# Patient Record
Sex: MALE | Race: WHITE | NOT HISPANIC OR LATINO | Employment: FULL TIME | URBAN - METROPOLITAN AREA
[De-identification: names, ages, dates, MRNs, and addresses within clinical notes are randomized per-mention and may not be internally consistent; named-entity substitution may affect disease eponyms.]

---

## 2017-08-07 ENCOUNTER — PATIENT OUTREACH (OUTPATIENT)
Dept: ADMINISTRATIVE | Facility: HOSPITAL | Age: 37
End: 2017-08-07

## 2020-02-04 ENCOUNTER — OFFICE VISIT (OUTPATIENT)
Dept: INTERNAL MEDICINE | Facility: CLINIC | Age: 40
End: 2020-02-04
Payer: COMMERCIAL

## 2020-02-04 VITALS
TEMPERATURE: 99 F | SYSTOLIC BLOOD PRESSURE: 130 MMHG | WEIGHT: 242.94 LBS | BODY MASS INDEX: 34.78 KG/M2 | DIASTOLIC BLOOD PRESSURE: 100 MMHG | HEIGHT: 70 IN | HEART RATE: 85 BPM

## 2020-02-04 DIAGNOSIS — R07.89 ATYPICAL CHEST PAIN: ICD-10-CM

## 2020-02-04 DIAGNOSIS — E66.09 CLASS 1 OBESITY DUE TO EXCESS CALORIES WITH SERIOUS COMORBIDITY AND BODY MASS INDEX (BMI) OF 34.0 TO 34.9 IN ADULT: Chronic | ICD-10-CM

## 2020-02-04 DIAGNOSIS — Z13.1 SCREENING FOR DIABETES MELLITUS: ICD-10-CM

## 2020-02-04 DIAGNOSIS — G47.33 OBSTRUCTIVE SLEEP APNEA SYNDROME: ICD-10-CM

## 2020-02-04 DIAGNOSIS — I10 BENIGN ESSENTIAL HYPERTENSION: Primary | ICD-10-CM

## 2020-02-04 DIAGNOSIS — Z13.220 SCREENING FOR LIPID DISORDERS: ICD-10-CM

## 2020-02-04 DIAGNOSIS — R53.83 OTHER FATIGUE: ICD-10-CM

## 2020-02-04 DIAGNOSIS — Z11.4 SCREENING FOR HIV WITHOUT PRESENCE OF RISK FACTORS: ICD-10-CM

## 2020-02-04 LAB — GLUCOSE SERPL-MCNC: 73 MG/DL (ref 70–110)

## 2020-02-04 PROCEDURE — 3008F BODY MASS INDEX DOCD: CPT | Mod: CPTII,S$GLB,, | Performed by: FAMILY MEDICINE

## 2020-02-04 PROCEDURE — 99204 OFFICE O/P NEW MOD 45 MIN: CPT | Mod: S$GLB,,, | Performed by: FAMILY MEDICINE

## 2020-02-04 PROCEDURE — 82962 GLUCOSE BLOOD TEST: CPT | Mod: S$GLB,,, | Performed by: FAMILY MEDICINE

## 2020-02-04 PROCEDURE — 99999 PR PBB SHADOW E&M-EST. PATIENT-LVL IV: ICD-10-PCS | Mod: PBBFAC,,, | Performed by: FAMILY MEDICINE

## 2020-02-04 PROCEDURE — 99999 PR PBB SHADOW E&M-EST. PATIENT-LVL IV: CPT | Mod: PBBFAC,,, | Performed by: FAMILY MEDICINE

## 2020-02-04 PROCEDURE — 3080F PR MOST RECENT DIASTOLIC BLOOD PRESSURE >= 90 MM HG: ICD-10-PCS | Mod: CPTII,S$GLB,, | Performed by: FAMILY MEDICINE

## 2020-02-04 PROCEDURE — 3075F PR MOST RECENT SYSTOLIC BLOOD PRESS GE 130-139MM HG: ICD-10-PCS | Mod: CPTII,S$GLB,, | Performed by: FAMILY MEDICINE

## 2020-02-04 PROCEDURE — 3080F DIAST BP >= 90 MM HG: CPT | Mod: CPTII,S$GLB,, | Performed by: FAMILY MEDICINE

## 2020-02-04 PROCEDURE — 3008F PR BODY MASS INDEX (BMI) DOCUMENTED: ICD-10-PCS | Mod: CPTII,S$GLB,, | Performed by: FAMILY MEDICINE

## 2020-02-04 PROCEDURE — 82962 POCT GLUCOSE, HAND-HELD DEVICE: ICD-10-PCS | Mod: S$GLB,,, | Performed by: FAMILY MEDICINE

## 2020-02-04 PROCEDURE — 3075F SYST BP GE 130 - 139MM HG: CPT | Mod: CPTII,S$GLB,, | Performed by: FAMILY MEDICINE

## 2020-02-04 PROCEDURE — 99204 PR OFFICE/OUTPT VISIT, NEW, LEVL IV, 45-59 MIN: ICD-10-PCS | Mod: S$GLB,,, | Performed by: FAMILY MEDICINE

## 2020-02-04 RX ORDER — METOPROLOL SUCCINATE 50 MG/1
50 TABLET, EXTENDED RELEASE ORAL NIGHTLY
Qty: 30 TABLET | Refills: 1 | Status: SHIPPED | OUTPATIENT
Start: 2020-02-04 | End: 2020-03-11 | Stop reason: ALTCHOICE

## 2020-02-04 NOTE — PROGRESS NOTES
CHIEF COMPLAINT  Establish Care    This is my first time treating him here. All problems addressed today are NEW TO ME.  He is requesting that I take over as his primary care provider.     SUMMARY OF DIAGNOSES AND ASSOCIATED ORDERS    1. Benign essential hypertension       ASSESSMENT: Asymptomatic, uncontrolled, untreated.  - SCHEDULED EKG 12-LEAD (to Muse); Future  - X-Ray Chest PA And Lateral; Future  - POCT Glucose, Hand-Held Device  - Lipid panel; Future  - HIV 1/2 Ag/Ab (4th Gen); Future  - Comprehensive metabolic panel; Future  - CBC auto differential; Future  - TSH; Future  - metoprolol succinate (TOPROL-XL) 50 MG 24 hr tablet; Take 1 tablet (50 mg total) by mouth every evening.  Dispense: 30 tablet; Refill: 1  - Hypertension Digital Medicine ([facility]) Enrollment Order  - Hypertension Digital Medicine ([facility]): Assign Onboarding Questionnaires    2. Atypical chest pain       ASSESSMENT: He reports few month history of intermittent non-exertional brief sharp mid-chest pain. No hemoptysis or trouble breathing reported.   - SCHEDULED EKG 12-LEAD (to Muse); Future  - X-Ray Chest PA And Lateral; Future  - Comprehensive metabolic panel; Future  - Cardiac treadmill stress test; Future  - metoprolol succinate (TOPROL-XL) 50 MG 24 hr tablet; Take 1 tablet (50 mg total) by mouth every evening.  Dispense: 30 tablet; Refill: 1    3. Class 1 obesity due to excess calories with serious comorbidity and body mass index (BMI) of 34.0 to 34.9 in adult       ASSESSMENT: Chronic. Appears exogenous.  - POCT Glucose, Hand-Held Device  - Comprehensive metabolic panel; Future  - TSH; Future    4. Obstructive sleep apnea syndrome       ASSESSMENT: He has multiple anatomic and historical risk factors for obstructive sleep apnea, as noted below.   - Home Sleep Studies; Future    5. Fatigue       ASSESSMENT: Likely multifactorial, in part due to deconditioning.  - Comprehensive metabolic panel; Future  - CBC auto differential; Future  - TSH;  Future    6. Screening for HIV without presence of risk factors       ASSESSMENT: He reports no specific exposure or risk factor or specific relevant symptoms, but he accepted offer for screening.   - HIV 1/2 Ag/Ab (4th Gen); Future    7. Screening for diabetes mellitus       ASSESSMENT: Ordered.  - Comprehensive metabolic panel; Future    8. Screening for lipid disorders       ASSESSMENT: Ordered.  - Lipid panel; Future    ADDITIONAL HISTORY OF PRESENT ILLNESS  Problem List Items Addressed This Visit        Cardiac/Vascular    Benign essential hypertension - Primary (Chronic)    Relevant Medications    metoprolol succinate (TOPROL-XL) 50 MG 24 hr tablet    Other Relevant Orders    SCHEDULED EKG 12-LEAD (to Muse) (Completed)    X-Ray Chest PA And Lateral (Completed)    POCT Glucose, Hand-Held Device (Completed)    Lipid panel (Completed)    HIV 1/2 Ag/Ab (4th Gen) (Completed)    Comprehensive metabolic panel (Completed)    CBC auto differential (Completed)    TSH (Completed)    Hypertension Digital Medicine (HDM) Enrollment Order (Completed)    Hypertension Digital Medicine ([facility]): Assign Onboarding Questionnaires (Completed)       Endocrine    Class 1 obesity due to excess calories with serious comorbidity and body mass index (BMI) of 34.0 to 34.9 in adult (Chronic)    Relevant Orders    POCT Glucose, Hand-Held Device (Completed)    Comprehensive metabolic panel (Completed)    TSH (Completed)       Other    Atypical chest pain    Current Assessment & Plan     3 months, evening, non-exer, mid-chest, pressure, last episode 2 weeks ago lasted 2 hours         Relevant Medications    metoprolol succinate (TOPROL-XL) 50 MG 24 hr tablet    Other Relevant Orders    SCHEDULED EKG 12-LEAD (to Muse) (Completed)    X-Ray Chest PA And Lateral (Completed)    Comprehensive metabolic panel (Completed)    Cardiac treadmill stress test (Completed)    Fatigue    Relevant Orders    Comprehensive metabolic panel (Completed)    CBC  auto differential (Completed)    TSH (Completed)    Obstructive sleep apnea syndrome    Current Assessment & Plan     STOP-BANG questionnaire to assess risk for obstructive sleep apnea (NEWTON)  · Snoring: Do you snore loudly? ANSWER: YES  · Tired: Do you often feel tired, fatigued, or sleepy during daytime? ANSWER: YES Wittman Sleepiness Scale Score = 9 (HIGHER NORMAL Daytime Sleepiness)  · Observed: Has anyone observed you stop breathing during your sleep? ANSWER: YES  · Pressure: Do you have or are you being treated for high blood pressure? ANSWER: YES  · BMI: BMI more than 35 kg/m2? ANSWER: NO (BMI = Body mass index is 34.86 kg/m².)   · Age: Age over 50 yr old? ANSWER: NO (Age = 39 y.o.)  · Neck circumference: Neck circumference greater than 40 cm? ANSWER: YES (Neck circumference = 47 cm, Mallampati class 3 oropharynx.  · Gender: Gender male? ANSWER: YES (Gender = male)    STOP-BANG Score = 6 (HIGH risk of NEWTON)    ADDITIONAL RELEVANT HISTORY: He has noticed or has been told that he wakes up gasping or choking.  He has been told he stops breathing when he sleeps.  He reports non-restorative sleep.  He reports frequent tension type headaches.  He reports involuntary weight gain.     INSTRUCTIONS PROVIDED: Do not drive or perform hazardous activities while drowsy.          Relevant Orders    Home Sleep Studies      Other Visit Diagnoses     Screening for HIV without presence of risk factors        Relevant Orders    HIV 1/2 Ag/Ab (4th Gen) (Completed)    Screening for diabetes mellitus        Relevant Orders    Comprehensive metabolic panel (Completed)    Screening for lipid disorders        Relevant Orders    Lipid panel (Completed)          MEDICATION MANAGMENT    MEDICATIONS SUMMARY: I am having Kenny Savannah Treviño start on metoprolol succinate.    No outpatient medications prior to visit.     No facility-administered medications prior to visit.         There are no discontinued medications.     Medications Ordered This  "Encounter   Medications    metoprolol succinate (TOPROL-XL) 50 MG 24 hr tablet     Sig: Take 1 tablet (50 mg total) by mouth every evening.     Dispense:  30 tablet     Refill:  1        FOLLOW-UP  Follow up in about 3 weeks (around 2/25/2020) for review test results, discuss treatment plan, re-evaluate problem(s) discussed today.     REVIEW OF SYSTEMS  CONSTITUTIONAL: No fever reported.  CARDIOVASCULAR: No typical angina reported. No syncope or near-syncope reported.  PULMONARY: No hemoptysis reported.  PSYCHIATRIC: No mood instability reported.  NEUROLOGIC: No seizures reported.  ENDOCRINE: No polydipsia reported.  GASTROINTESTINAL: No blood in stool reported.  GENITOURINARY: No hematuria reported.  ENT: No acute hearing changes reported.  OPHTHALMOLOGIC: No acute vision changes reported.  HEMATOLOGIC: No bleeding problems reported.  MUSCULOSKELETAL: No recent injuries reported.  DERMATOLOGIC: No skin infection reported.  REMAINDER OF COMPLETE REVIEW OF SYSTEMS is negative or noncontributory to present illness except as noted herein.     PHYSICAL EXAM  Vitals:    02/04/20 1712   BP: (!) 130/100   BP Location: Left arm   Patient Position: Sitting   BP Method: Medium (Manual)   Pulse: 85   Temp: 98.7 °F (37.1 °C)   TempSrc: Tympanic   Weight: 110.2 kg (242 lb 15.2 oz)   Height: 5' 10" (1.778 m)     CONSTITUTIONAL: Vital signs noted. No apparent distress. Does not appear acutely ill or septic. Appears adequately hydrated.  EYE: Sclerae anicteric. Lids and conjunctiva unremarkable.  ENT: External ENT unremarkable. Oropharynx moist. Hearing grossly intact.   NECK: Trachea midline. Thyroid nontender.  PULM: Lungs clear. Breathing unlabored.  CV: Auscultation reveals regular rate and rhythm without murmur, gallop or rub. No carotid bruit.  GI: Soft and nontender. Bowel sounds normal.  DERM: Skin warm and moist with normal turgor.  NEURO: There are no gross focal motor deficits or gross deficits of cranial nerves " "III-XII.  PSYCH: Alert and oriented x 3. Mood is grossly neutral. Affect appropriate. Judgment and insight not grossly compromised.  MSK:  Stance and gait are grossly normal.     PAST MEDICAL HISTORY  He has a past medical history of Benign essential hypertension and Testicular cancer.    SURGICAL HISTORY  He has a past surgical history that includes Hernia repair; testicle removal  (Right); Adenoidectomy; Tonsillectomy; and Vasectomy.    FAMILY HISTORY  His family history includes Diabetes in his father; Heart disease in his paternal grandfather.     ALLERGIES  Review of patient's allergies indicates:   Allergen Reactions    Pcn [penicillins]        SOCIAL HISTORY   TOBACCO USE HISTORY: He reports that he has never smoked. He has never used smokeless tobacco.   ALCOHOL USE HISTORY:  He reports that he drinks alcohol.   RECREATIONAL DRUG USE HISTORY:  He reports that he does not use drugs.     Documentation entered by me for this encounter may have been done in part using speech-recognition technology. Although I have made an effort to ensure accuracy, "sound like" errors may exist and should be interpreted in context. -CYN Rodriguez MD.    "

## 2020-02-05 ENCOUNTER — LAB VISIT (OUTPATIENT)
Dept: LAB | Facility: HOSPITAL | Age: 40
End: 2020-02-05
Attending: FAMILY MEDICINE
Payer: COMMERCIAL

## 2020-02-05 ENCOUNTER — CLINICAL SUPPORT (OUTPATIENT)
Dept: CARDIOLOGY | Facility: CLINIC | Age: 40
End: 2020-02-05
Payer: COMMERCIAL

## 2020-02-05 ENCOUNTER — PATIENT MESSAGE (OUTPATIENT)
Dept: ADMINISTRATIVE | Facility: OTHER | Age: 40
End: 2020-02-05

## 2020-02-05 ENCOUNTER — TELEPHONE (OUTPATIENT)
Dept: PULMONOLOGY | Facility: CLINIC | Age: 40
End: 2020-02-05

## 2020-02-05 ENCOUNTER — HOSPITAL ENCOUNTER (OUTPATIENT)
Dept: RADIOLOGY | Facility: HOSPITAL | Age: 40
Discharge: HOME | End: 2020-02-05
Attending: FAMILY MEDICINE
Payer: COMMERCIAL

## 2020-02-05 DIAGNOSIS — R53.83 OTHER FATIGUE: ICD-10-CM

## 2020-02-05 DIAGNOSIS — R07.89 ATYPICAL CHEST PAIN: ICD-10-CM

## 2020-02-05 DIAGNOSIS — I10 BENIGN ESSENTIAL HYPERTENSION: ICD-10-CM

## 2020-02-05 DIAGNOSIS — Z11.4 SCREENING FOR HIV WITHOUT PRESENCE OF RISK FACTORS: ICD-10-CM

## 2020-02-05 DIAGNOSIS — Z13.1 SCREENING FOR DIABETES MELLITUS: ICD-10-CM

## 2020-02-05 DIAGNOSIS — E66.09 CLASS 1 OBESITY DUE TO EXCESS CALORIES WITH SERIOUS COMORBIDITY AND BODY MASS INDEX (BMI) OF 34.0 TO 34.9 IN ADULT: Chronic | ICD-10-CM

## 2020-02-05 DIAGNOSIS — Z13.220 SCREENING FOR LIPID DISORDERS: ICD-10-CM

## 2020-02-05 LAB
ALBUMIN SERPL BCP-MCNC: 4.1 G/DL (ref 3.5–5.2)
ALP SERPL-CCNC: 66 U/L (ref 55–135)
ALT SERPL W/O P-5'-P-CCNC: 36 U/L (ref 10–44)
ANION GAP SERPL CALC-SCNC: 11 MMOL/L (ref 8–16)
AST SERPL-CCNC: 22 U/L (ref 10–40)
BASOPHILS # BLD AUTO: 0.06 K/UL (ref 0–0.2)
BASOPHILS NFR BLD: 0.7 % (ref 0–1.9)
BILIRUB SERPL-MCNC: 0.9 MG/DL (ref 0.1–1)
BUN SERPL-MCNC: 13 MG/DL (ref 6–20)
CALCIUM SERPL-MCNC: 9.5 MG/DL (ref 8.7–10.5)
CHLORIDE SERPL-SCNC: 104 MMOL/L (ref 95–110)
CHOLEST SERPL-MCNC: 187 MG/DL (ref 120–199)
CHOLEST/HDLC SERPL: 6.2 {RATIO} (ref 2–5)
CO2 SERPL-SCNC: 27 MMOL/L (ref 23–29)
CREAT SERPL-MCNC: 1.2 MG/DL (ref 0.5–1.4)
DIFFERENTIAL METHOD: ABNORMAL
EOSINOPHIL # BLD AUTO: 0.2 K/UL (ref 0–0.5)
EOSINOPHIL NFR BLD: 2.5 % (ref 0–8)
ERYTHROCYTE [DISTWIDTH] IN BLOOD BY AUTOMATED COUNT: 12.4 % (ref 11.5–14.5)
EST. GFR  (AFRICAN AMERICAN): >60 ML/MIN/1.73 M^2
EST. GFR  (NON AFRICAN AMERICAN): >60 ML/MIN/1.73 M^2
GLUCOSE SERPL-MCNC: 101 MG/DL (ref 70–110)
HCT VFR BLD AUTO: 56.3 % (ref 40–54)
HDLC SERPL-MCNC: 30 MG/DL (ref 40–75)
HDLC SERPL: 16 % (ref 20–50)
HGB BLD-MCNC: 17.8 G/DL (ref 14–18)
IMM GRANULOCYTES # BLD AUTO: 0.02 K/UL (ref 0–0.04)
IMM GRANULOCYTES NFR BLD AUTO: 0.2 % (ref 0–0.5)
LDLC SERPL CALC-MCNC: 111.4 MG/DL (ref 63–159)
LYMPHOCYTES # BLD AUTO: 2.6 K/UL (ref 1–4.8)
LYMPHOCYTES NFR BLD: 31.2 % (ref 18–48)
MCH RBC QN AUTO: 30 PG (ref 27–31)
MCHC RBC AUTO-ENTMCNC: 31.6 G/DL (ref 32–36)
MCV RBC AUTO: 95 FL (ref 82–98)
MONOCYTES # BLD AUTO: 0.8 K/UL (ref 0.3–1)
MONOCYTES NFR BLD: 8.9 % (ref 4–15)
NEUTROPHILS # BLD AUTO: 4.8 K/UL (ref 1.8–7.7)
NEUTROPHILS NFR BLD: 56.5 % (ref 38–73)
NONHDLC SERPL-MCNC: 157 MG/DL
NRBC BLD-RTO: 0 /100 WBC
PLATELET # BLD AUTO: 210 K/UL (ref 150–350)
PMV BLD AUTO: 11.4 FL (ref 9.2–12.9)
POTASSIUM SERPL-SCNC: 4 MMOL/L (ref 3.5–5.1)
PROT SERPL-MCNC: 7.3 G/DL (ref 6–8.4)
RBC # BLD AUTO: 5.94 M/UL (ref 4.6–6.2)
SODIUM SERPL-SCNC: 142 MMOL/L (ref 136–145)
TRIGL SERPL-MCNC: 228 MG/DL (ref 30–150)
TSH SERPL DL<=0.005 MIU/L-ACNC: 1.52 UIU/ML (ref 0.4–4)
WBC # BLD AUTO: 8.46 K/UL (ref 3.9–12.7)

## 2020-02-05 PROCEDURE — 80053 COMPREHEN METABOLIC PANEL: CPT

## 2020-02-05 PROCEDURE — 85025 COMPLETE CBC W/AUTO DIFF WBC: CPT

## 2020-02-05 PROCEDURE — 80061 LIPID PANEL: CPT

## 2020-02-05 PROCEDURE — 93010 EKG 12-LEAD: ICD-10-PCS | Mod: S$GLB,,, | Performed by: INTERNAL MEDICINE

## 2020-02-05 PROCEDURE — 71046 X-RAY EXAM CHEST 2 VIEWS: CPT | Mod: TC

## 2020-02-05 PROCEDURE — 93005 EKG 12-LEAD: ICD-10-PCS | Mod: S$GLB,,, | Performed by: FAMILY MEDICINE

## 2020-02-05 PROCEDURE — 93005 ELECTROCARDIOGRAM TRACING: CPT | Mod: S$GLB,,, | Performed by: FAMILY MEDICINE

## 2020-02-05 PROCEDURE — 93010 ELECTROCARDIOGRAM REPORT: CPT | Mod: S$GLB,,, | Performed by: INTERNAL MEDICINE

## 2020-02-05 PROCEDURE — 71046 X-RAY EXAM CHEST 2 VIEWS: CPT | Mod: 26,,, | Performed by: RADIOLOGY

## 2020-02-05 PROCEDURE — 71046 XR CHEST PA AND LATERAL: ICD-10-PCS | Mod: 26,,, | Performed by: RADIOLOGY

## 2020-02-05 PROCEDURE — 36415 COLL VENOUS BLD VENIPUNCTURE: CPT

## 2020-02-05 PROCEDURE — 86703 HIV-1/HIV-2 1 RESULT ANTBDY: CPT

## 2020-02-05 PROCEDURE — 84443 ASSAY THYROID STIM HORMONE: CPT

## 2020-02-05 NOTE — PATIENT INSTRUCTIONS
Someone from [facility] will be contacting you soon to help you get the home sleep test done. If you haven't heard from them within 2 weeks, please call the [facility] at [phone] and let them know that you were ordered to have a home sleep test done and that you haven't yet been contacted.       Enroll in [facility]'s HYPERTENSION Digital Medicine program and you can use an electronic blood pressure monitor to measure your blood pressure at home. Those numbers are automatically sent by your smartphone to your hypertension care team for review. You will also receive in-depth education on high blood pressure and the lifestyle changes you can make to improve your blood pressure.    Enrolling is easy!  Stop by the O-Bar in the 1st floor lobby of [facility].  You can also call the [facility] Help Desk at [phone] to get started.

## 2020-02-05 NOTE — ASSESSMENT & PLAN NOTE
STOP-BANG questionnaire to assess risk for obstructive sleep apnea (NEWTON)  · Snoring: Do you snore loudly? ANSWER: YES  · Tired: Do you often feel tired, fatigued, or sleepy during daytime? ANSWER: YES Duncan Sleepiness Scale Score = 9 (HIGHER NORMAL Daytime Sleepiness)  · Observed: Has anyone observed you stop breathing during your sleep? ANSWER: YES  · Pressure: Do you have or are you being treated for high blood pressure? ANSWER: YES  · BMI: BMI more than 35 kg/m2? ANSWER: NO (BMI = Body mass index is 34.86 kg/m².)   · Age: Age over 50 yr old? ANSWER: NO (Age = 39 y.o.)  · Neck circumference: Neck circumference greater than 40 cm? ANSWER: YES (Neck circumference = 47 cm, Mallampati class 3 oropharynx.  · Gender: Gender male? ANSWER: YES (Gender = male)    STOP-BANG Score = 6 (HIGH risk of NEWTON)    ADDITIONAL RELEVANT HISTORY: He has noticed or has been told that he wakes up gasping or choking.  He has been told he stops breathing when he sleeps.  He reports non-restorative sleep.  He reports frequent tension type headaches.  He reports involuntary weight gain.     INSTRUCTIONS PROVIDED: Do not drive or perform hazardous activities while drowsy.

## 2020-02-05 NOTE — TELEPHONE ENCOUNTER
----- Message from Kristel Bedoya sent at 2/5/2020  9:32 AM CST -----  [facility]

## 2020-02-06 LAB — HIV 1+2 AB+HIV1 P24 AG SERPL QL IA: NEGATIVE

## 2020-02-07 ENCOUNTER — CLINICAL SUPPORT (OUTPATIENT)
Dept: CARDIOLOGY | Facility: CLINIC | Age: 40
End: 2020-02-07
Attending: FAMILY MEDICINE
Payer: COMMERCIAL

## 2020-02-07 ENCOUNTER — PATIENT OUTREACH (OUTPATIENT)
Dept: OTHER | Facility: OTHER | Age: 40
End: 2020-02-07

## 2020-02-07 DIAGNOSIS — R07.89 ATYPICAL CHEST PAIN: ICD-10-CM

## 2020-02-07 LAB — DIASTOLIC DYSFUNCTION: NO

## 2020-02-07 PROCEDURE — 93015 CARDIAC TREADMILL STRESS TEST: ICD-10-PCS | Mod: S$GLB,,, | Performed by: INTERNAL MEDICINE

## 2020-02-07 PROCEDURE — 93015 CV STRESS TEST SUPVJ I&R: CPT | Mod: S$GLB,,, | Performed by: INTERNAL MEDICINE

## 2020-02-07 NOTE — LETTER
February 13, 2020     Kenny Stern  [address] [address]       Dear Kenny Russell,    Welcome to [facility]! Our goal is to make care effective, proactive and convenient by using data you send us from home to better treat your chronic conditions.              My name is Gladis Stiles, and I am your dedicated Digital Medicine clinician. As an expert in medication management, I will help ensure that the medications you are taking continue to provide the intended benefits and help you reach your goals. You can reach me directly at [phone] or by sending me a message directly through your [facility] account.      I am Adolfo Walker and I will be your health . My job is to help you identify lifestyle changes to improve your disease control. We will talk about nutrition, exercise, and other ways you may be able to adjust your current habits to better your health. Additionally, we will help ensure you are completing the tests and screenings that are necessary to help manage your conditions. You can reach me directly at [phone] or by sending me a message directly through your [facility] account.    Most importantly, YOU are at the center of this team. Together, we will work to improve your overall health and encourage you to meet your goals for a healthier lifestyle.     What we expect from YOU:  · Please take frequent home blood pressure measurements. We ask that you take at least 1 blood pressure reading per week, but more information will better help us get you know you. Be sure you rest for a few minutes before taking the reading in a quiet, comfortable place.     Be available to receive phone calls or ePrept messages, when appropriate, from your care team. Please let us know if there are any specific days or times that work best for us to reach you via phone.     Complete routine tests and screenings. Dont worry, we will help keep you on  track!           What you should expect from your Digital Medicine Care Team:   We will work with you to create a personalized plan of care and provide you with encouragement and education, including regarding lifestyle changes, that could help you manage your disease states.     We will adjust your current medications, if needed, and continue to monitor your long-term progress.     We will provide you and your physician with monthly progress reports after you have been in the program for more than 30 days.     We will send you reminders through [facility] and text messages to help ensure you do not miss any testing deadlines to help manage your disease states.    You will be able to reach us by phone or through your [facility] account by clicking our names under Care Team on the right side of the home screen.    I look forward to working with you to achieve your blood pressure goals!    We look forward to working with you to help manage your health,    Sincerely,    Your Digital Medicine Team    Please visit our websites to learn more:   · Hypertension: [email or web address]      Remember, we are not available for emergencies. If you have an emergency, please contact your doctors office directly or call [facility] on-call ([phone] or [phone]) or [phone].

## 2020-02-07 NOTE — LETTER
February 13, 2020     Kenny Stern  [address] [address]       Dear Kenny Russell,    Welcome to [facility]! Our goal is to make care effective, proactive and convenient by using data you send us from home to better treat your chronic conditions.              My name is Gladis Stiles, and I am your dedicated Digital Medicine clinician. As an expert in medication management, I will help ensure that the medications you are taking continue to provide the intended benefits and help you reach your goals. You can reach me directly at [phone] or by sending me a message directly through your [facility] account.      I am Adolfo Walker and I will be your health . My job is to help you identify lifestyle changes to improve your disease control. We will talk about nutrition, exercise, and other ways you may be able to adjust your current habits to better your health. Additionally, we will help ensure you are completing the tests and screenings that are necessary to help manage your conditions. You can reach me directly at [phone] or by sending me a message directly through your [facility] account.    Most importantly, YOU are at the center of this team. Together, we will work to improve your overall health and encourage you to meet your goals for a healthier lifestyle.     What we expect from YOU:  · Please take frequent home blood pressure measurements. We ask that you take at least 1 blood pressure reading per week, but more information will better help us get you know you. Be sure you rest for a few minutes before taking the reading in a quiet, comfortable place.     Be available to receive phone calls or [employer] messages, when appropriate, from your care team. Please let us know if there are any specific days or times that work best for us to reach you via phone.     Complete routine tests and screenings. Dont worry, we will help keep you on  track!           What you should expect from your Digital Medicine Care Team:   We will work with you to create a personalized plan of care and provide you with encouragement and education, including regarding lifestyle changes, that could help you manage your disease states.     We will adjust your current medications, if needed, and continue to monitor your long-term progress.     We will provide you and your physician with monthly progress reports after you have been in the program for more than 30 days.     We will send you reminders through [facility] and text messages to help ensure you do not miss any testing deadlines to help manage your disease states.    You will be able to reach us by phone or through your [facility] account by clicking our names under Care Team on the right side of the home screen.    I look forward to working with you to achieve your blood pressure goals!    We look forward to working with you to help manage your health,    Sincerely,    Your Digital Medicine Team    Please visit our websites to learn more:   · Hypertension: [email or web address]      Remember, we are not available for emergencies. If you have an emergency, please contact your doctors office directly or call [facility] on-call ([phone] or [phone]) or [phone].

## 2020-02-13 NOTE — PROGRESS NOTES
Digital Medicine: Health  Introduction    Introduced Kenny Drew Stern to Digital Medicine. Discussed health  role and recommended lifestyle modifications.    Mr. Calero will be on vacation from next week until the week of March 3rd. He asked that we please do follow up calls after his return. He did not specify if he will continue submitting readings during this time.     The history is provided by the patient. No  was used.     HYPERTENSION  Our goal is to get BP to consistently below 130/80mmHg and make the process convenient so patient can avoid extra trips to the office. Getting your blood pressure below 130/80mmHg (definition of control) will reduce your risk for heart attack, kidney failure, stroke and death (as well as kidney failure, eye disease, & dementia)      Reviewed that the Digital Medicine care team - consisting of a clinician and a health  - will follow the most current evidence-based national guidelines for treating your condition.  The health  will focus on lifestyle modifications and motivation while the clinician will focus on medication therapy.  The care team will review all data on a regular basis and reach out as needed.      Explained that one of the key parts of the program is communication with the care team.  Asked patient to respond to outreach attempts and complete questionnaires.  Stressed importance of medication adherence.    Explained that we expect patient to obtain several blood pressures per week at random times of day.  Instructed patient not to allow anyone else to use phone and monitoring device.  Confirmed appropriate BP monitoring technique.      Explained to patient that the digital medicine team is not available for emergencies.  Patient will call [facility] on-call ([phone] or [phone]) or [phone] if needed.      Patient's BP goal is 130/80.Patient's BP average is 141/100 mmHg, which is above goal, per 2017 ACC/AHA  Hypertension Guidelines.        Last 5 Patient Entered Readings                                      Current 30 Day Average: 141/100     Recent Readings 2/7/2020 2/6/2020 2/5/2020 2/5/2020    SBP (mmHg) 139 140 145 150    DBP (mmHg) 99 94 96 109    Pulse 66 77 80 72        There are no preventive care reminders to display for this patient.    Reviewed the importance of self-monitoring, medication adherence, and that the health  can be used as a resource for lifestyle modifications to help reduce or maintain a healthy lifestyle.    Sent link to [facility] webpages and my contact information via [employer] for future questions. Follow up scheduled.

## 2020-02-16 NOTE — PROGRESS NOTES
"Hi, Kenny.    I'm happy to report that these test results are fine and do not require a change in treatment.    Thanks for letting me care for you, thanks for trusting us with your healthcare needs, and thanks for using [facility].    Sincerely,    CYN Rodriguez MD    P.S. - Please tell me how I'm doing and review me at [email or web address].  --------------------------------------------------------------------------------   Want to learn more about your test results and what they mean? (1) Log in to your [facility] account at [email or web address]. (2) From the "View test results" tab, click on the test you want to know more about. (3) Click on the "About This Test" link."

## 2020-02-16 NOTE — PROGRESS NOTES
"Hi, Kenny.    I'm happy to report that these test results are fine and do not require a change in treatment.    Thanks for letting me care for you, thanks for trusting us with your healthcare needs, and thanks for using [facility].    Sincerely,    CYN Rodriguez MD    P.S. - Please tell me how I'm doing and review me at [email or web address].  --------------------------------------------------------------------------------   Want to learn more about your test results and what they mean? (1) Log in to your [facility] account at [email or web address]. (2) From the "View test results" tab, click on the test you want to know more about. (3) Click on the "About This Test" link."

## 2020-02-17 ENCOUNTER — PATIENT OUTREACH (OUTPATIENT)
Dept: OTHER | Facility: OTHER | Age: 40
End: 2020-02-17

## 2020-02-20 ENCOUNTER — PROCEDURE VISIT (OUTPATIENT)
Dept: SLEEP MEDICINE | Facility: CLINIC | Age: 40
End: 2020-02-20
Payer: COMMERCIAL

## 2020-02-20 DIAGNOSIS — R06.83 PRIMARY SNORING: Primary | ICD-10-CM

## 2020-02-20 DIAGNOSIS — G47.33 OBSTRUCTIVE SLEEP APNEA SYNDROME: ICD-10-CM

## 2020-02-20 PROCEDURE — 95800 SLP STDY UNATTENDED: CPT

## 2020-02-20 PROCEDURE — 99499 UNLISTED E&M SERVICE: CPT | Mod: S$GLB,,, | Performed by: INTERNAL MEDICINE

## 2020-02-20 PROCEDURE — 99499 NO LOS: ICD-10-PCS | Mod: S$GLB,,, | Performed by: INTERNAL MEDICINE

## 2020-02-20 NOTE — Clinical Note
PHYSICIAN INTERPRETATION AND COMMENTS: Findings are consistent with PRIMARY SNORING: AHI 4.0EVENTS PER HOUR. SATURATION MIKE IN 89.8%. LOUD SNORING. PLEASE REFER TO SLEEP DISORDERCENTER FOR FURTHER EVALUATION: Ref 301 ORDER IN EPIC.CLINICAL HISTORY: 39 year old male presented with: STOP BANG SCORE 6. CHOKING, WITNESSED APNEA, NONREFRESHING SLEEP, MORNING HEADACHES. 18.5 inch neck, BMI of 34, an Selmer sleepiness score of 6, history ofhypertension and symptoms of nocturnal snoring, waking up choking and witnessed apneas. Based on the clinical history, the patienthas a high pre-test probability of having severe NEWTON.SLEEP STUDY FINDINGS: Patient underwent a one night Home Sleep Test and by behavioral criteria, slept for approximately 6hours, with a sleep latency of 29 minutes and a sleep efficiency of 89.8%. The patient slept supine 33.3% of the night based on validrecording time of 6.02 hours. When considering more subtle measures of sleep disordered breathing, the overall respiratorydistu

## 2020-02-21 PROCEDURE — 95800 PR SLEEP STUDY, UNATTENDED, RECORD HEART RATE/O2 SAT/RESP ANAL/SLEEP TIME: ICD-10-PCS | Mod: 26,,, | Performed by: INTERNAL MEDICINE

## 2020-02-21 PROCEDURE — 95800 SLP STDY UNATTENDED: CPT | Mod: 26,,, | Performed by: INTERNAL MEDICINE

## 2020-02-22 NOTE — PROCEDURES
Home Sleep Studies  Date/Time: 2/20/2020 8:00 AM  Performed by: Avel Betancourt MD  Authorized by: CYN Rodriguez MD       PHYSICIAN INTERPRETATION AND COMMENTS: Findings are consistent with PRIMARY SNORING: AHI 4.0  EVENTS PER HOUR. SATURATION MIKE IN 89.8%. LOUD SNORING. PLEASE REFER TO SLEEP DISORDER  CENTER FOR FURTHER EVALUATION: Ref 301 ORDER IN EPIC.  CLINICAL HISTORY: 39 year old male presented with: STOP BANG SCORE 6. CHOKING, WITNESSED APNEA, NON  REFRESHING SLEEP, MORNING HEADACHES. 18.5 inch neck, BMI of 34, an [place] sleepiness score of 6, history of  hypertension and symptoms of nocturnal snoring, waking up choking and witnessed apneas. Based on the clinical history, the patient  has a high pre-test probability of having severe NEWTON.  SLEEP STUDY FINDINGS: Patient underwent a one night Home Sleep Test and by behavioral criteria, slept for approximately 6  hours, with a sleep latency of 29 minutes and a sleep efficiency of 89.8%. The patient slept supine 33.3% of the night based on valid  recording time of 6.02 hours. When considering more subtle measures of sleep disordered breathing, the overall respiratory  disturbance index is mild (RDI=15) based on a 1% hypopnea desaturation criteria with confirmation by surrogate arousal  indicators. The apneas/hypopneas are accompanied by minimal oxygen desaturation (percent time below 90% SpO2: 0.0%, Min  SpO2: 89.8%). The average desaturation across all sleep disordered breathing events is 2.2%. Snoring occurs for 35.3% (30 dB)  of the study, 15.9% is extremely loud. The mean pulse rate is 56 BPM, with very frequent pulse rate variability (76 events with >= 6  BPM increase/decrease per hour).  TREATMENT CONSIDERATIONS: For a patient with mild asymptomatic NEWTON, nasal continuous positive airway pressure  (CPAP/AutoPAP) therapy or alternative therapies for treatment should be considered, i.e., Mandibular advancement splint (MAS),  referral to an ENT for  surgical modifications to the airway, and/or weight loss or behavioral therapy to reduce the potential risk of  daytime somnolence, hypertension, cardiovascular disease, stroke and diabetes.  DISEASE MANAGEMENT CONSIDERATIONS: Morning headaches are symptoms that can be associated with  untreated NEWTON.

## 2020-02-22 NOTE — PROGRESS NOTES
PHYSICIAN INTERPRETATION AND COMMENTS: Findings are consistent with PRIMARY SNORING: AHI 4.0  EVENTS PER HOUR. SATURATION MIKE IN 89.8%. LOUD SNORING. PLEASE REFER TO SLEEP DISORDER  CENTER FOR FURTHER EVALUATION: Ref 301 ORDER IN EPIC.  CLINICAL HISTORY: 39 year old male presented with: STOP BANG SCORE 6. CHOKING, WITNESSED APNEA, NON  REFRESHING SLEEP, MORNING HEADACHES. 18.5 inch neck, BMI of 34, an [place] sleepiness score of 6, history of  hypertension and symptoms of nocturnal snoring, waking up choking and witnessed apneas. Based on the clinical history, the patient  has a high pre-test probability of having severe NEWTON.  SLEEP STUDY FINDINGS: Patient underwent a one night Home Sleep Test and by behavioral criteria, slept for approximately 6  hours, with a sleep latency of 29 minutes and a sleep efficiency of 89.8%. The patient slept supine 33.3% of the night based on valid  recording time of 6.02 hours. When considering more subtle measures of sleep disordered breathing, the overall respiratory  disturbance index is mild (RDI=15) based on a 1% hypopnea desaturation criteria with confirmation by surrogate arousal  indicators. The apneas/hypopneas are accompanied by minimal oxygen desaturation (percent time below 90% SpO2: 0.0%, Min  SpO2: 89.8%). The average desaturation across all sleep disordered breathing events is 2.2%. Snoring occurs for 35.3% (30 dB)  of the study, 15.9% is extremely loud. The mean pulse rate is 56 BPM, with very frequent pulse rate variability (76 events with >= 6  BPM increase/decrease per hour).  TREATMENT CONSIDERATIONS: For a patient with mild asymptomatic NEWTON, nasal continuous positive airway pressure  (CPAP/AutoPAP) therapy or alternative therapies for treatment should be considered, i.e., Mandibular advancement splint (MAS),  referral to an ENT for surgical modifications to the airway, and/or weight loss or behavioral therapy to reduce the potential risk of  daytime somnolence,  hypertension, cardiovascular disease, stroke and diabetes.  DISEASE MANAGEMENT CONSIDERATIONS: Morning headaches are symptoms that can be associated with  untreated NEWTON.

## 2020-02-23 ENCOUNTER — PATIENT MESSAGE (OUTPATIENT)
Dept: INTERNAL MEDICINE | Facility: CLINIC | Age: 40
End: 2020-02-23

## 2020-03-04 ENCOUNTER — PATIENT OUTREACH (OUTPATIENT)
Dept: OTHER | Facility: OTHER | Age: 40
End: 2020-03-04

## 2020-03-04 NOTE — PROGRESS NOTES
Digital Medicine: Health  Follow-Up    The history is provided by the patient. No  was used.     Follow Up  Follow-up reason(s): reading review      Readings are missing.   patient reminder needed.    Just returned from vacation. Called to request at least one reading a week now that he is back. Also reminded Mr. Carolina to charge his monitor base before submitting readings. Will follow up on 2-3 weeks on lifestyle once readings are submitted.     There are no preventive care reminders to display for this patient.    Last 5 Patient Entered Readings                                      Current 30 Day Average: 140/98     Recent Readings 2/18/2020 2/15/2020 2/7/2020 2/6/2020 2/5/2020    SBP (mmHg) 138 136 139 140 145    DBP (mmHg) 94 96 99 94 96    Pulse 80 77 66 77 80

## 2020-03-09 NOTE — PROGRESS NOTES
Digital Medicine: Clinician Introduction    Kenny Stern is a 39 y.o. male who is newly enrolled in the Digital Medicine Clinic.    The following information was reviewed and updated:  Preferred pharmacy   [id number] - [place], [place] - [address] [facility] [id number]  [place] [address]  Phone: [phone] Fax: [phone]      Patient prefers a 90 days supply.     Review of patient's allergies indicates:   Allergen Reactions    Pcn [penicillins]        I spoke with the patient today for the initial enrollment call. He is taking his BP readings while sitting on the bed. I described appropriate technique and he aggrees to start taking readings sitting at a table.     Diet: does not monitor sodium    Exercise:  Will start start jogging, walking, or runing    He recently had a sleep apnea test but has to call to get the results       The history is provided by the patient. No  was used.     HYPERTENSION  Our goal is to get BP to consistently below 130/80mmHg and make the process convenient so patient can avoid extra trips to the office. Getting your blood pressure below 130/80mmHg (definition of control) will reduce your risk for heart attack, kidney failure, stroke and death (as well as kidney failure, eye disease, & dementia)      Reviewed non-pharmacologic therapies and impact on BP      Explained that we expect patient to obtain several blood pressures per week at random times of day.  Instructed patient not to allow anyone else to use phone and monitoring device.  Confirmed appropriate BP monitoring technique.      Explained to patient that the digital medicine team is not available for emergencies.  Patient will call [facility] on-call ([phone] or [phone]) or [phone] if needed.    Patient's BP goal is 130/80. Patients BP average is 132/91 mmHg, which is above goal, per 2017 ACC/AHA Hypertension Guidelines.  When asked  what the patient thinks is causing BP to be elevated, they state: diet      Med Review complete.    Allergies reviewed.      Last 5 Patient Entered Readings                                      Current 30 Day Average: 137/95     Recent Readings 2/18/2020 2/15/2020 2/7/2020 2/6/2020 2/5/2020    SBP (mmHg) 138 136 139 140 145    DBP (mmHg) 94 96 99 94 96    Pulse 80 77 66 77 80            INTERVENTION(S)  reviewed appropriate dose schedule, recommended diet modifications, recommended physical activity, reviewed monitoring technique and encouragement/support    PLAN  patient verbalizes understanding, patient amenable to changes and additional monitoring needed    Avr BP above goal 137/95    He is taking readings incorrectly. Will hold off on med changes until tecnique is corrected and he give more readings. F/u in 2 weeks.       There are no preventive care reminders to display for this patient.    Current Medication Regimen:  Hypertension Medications             metoprolol succinate (TOPROL-XL) 50 MG 24 hr tablet Take 1 tablet (50 mg total) by mouth every evening.            Reviewed the importance of self-monitoring, medication adherence, and that the health  can be used as a resource for lifestyle modifications to help reduce or maintain a healthy lifestyle.    Sent link to [facility] Digital Medicine webpages and my contact information via [employer] for future questions. Follow up scheduled.             Sleep Apnea Screening  Patient not previously diagnosed with NEWTON and     Medication Affordability Screening  Patient is currently not having problems affording medications    Medication Adherence Screening   He did not miss a dose this month.

## 2020-03-10 ENCOUNTER — PATIENT MESSAGE (OUTPATIENT)
Dept: PULMONOLOGY | Facility: CLINIC | Age: 40
End: 2020-03-10

## 2020-03-11 ENCOUNTER — OFFICE VISIT (OUTPATIENT)
Dept: INTERNAL MEDICINE | Facility: CLINIC | Age: 40
End: 2020-03-11
Payer: COMMERCIAL

## 2020-03-11 VITALS
WEIGHT: 238.13 LBS | SYSTOLIC BLOOD PRESSURE: 128 MMHG | HEIGHT: 70 IN | HEART RATE: 70 BPM | DIASTOLIC BLOOD PRESSURE: 88 MMHG | BODY MASS INDEX: 34.09 KG/M2 | OXYGEN SATURATION: 95 % | TEMPERATURE: 98 F

## 2020-03-11 DIAGNOSIS — I10 BENIGN ESSENTIAL HYPERTENSION: Primary | Chronic | ICD-10-CM

## 2020-03-11 DIAGNOSIS — E78.2 MODERATE MIXED HYPERLIPIDEMIA NOT REQUIRING STATIN THERAPY: Chronic | ICD-10-CM

## 2020-03-11 DIAGNOSIS — G44.219 EPISODIC TENSION-TYPE HEADACHE, NOT INTRACTABLE: Chronic | ICD-10-CM

## 2020-03-11 DIAGNOSIS — R07.89 ATYPICAL CHEST PAIN: ICD-10-CM

## 2020-03-11 DIAGNOSIS — G47.10 HYPERSOMNIA: Chronic | ICD-10-CM

## 2020-03-11 PROBLEM — R06.83 PRIMARY SNORING: Chronic | Status: ACTIVE | Noted: 2020-02-04

## 2020-03-11 PROCEDURE — 3074F PR MOST RECENT SYSTOLIC BLOOD PRESSURE < 130 MM HG: ICD-10-PCS | Mod: CPTII,S$GLB,, | Performed by: FAMILY MEDICINE

## 2020-03-11 PROCEDURE — 99999 PR PBB SHADOW E&M-EST. PATIENT-LVL IV: ICD-10-PCS | Mod: PBBFAC,,, | Performed by: FAMILY MEDICINE

## 2020-03-11 PROCEDURE — 3079F PR MOST RECENT DIASTOLIC BLOOD PRESSURE 80-89 MM HG: ICD-10-PCS | Mod: CPTII,S$GLB,, | Performed by: FAMILY MEDICINE

## 2020-03-11 PROCEDURE — 3008F BODY MASS INDEX DOCD: CPT | Mod: CPTII,S$GLB,, | Performed by: FAMILY MEDICINE

## 2020-03-11 PROCEDURE — 99999 PR PBB SHADOW E&M-EST. PATIENT-LVL IV: CPT | Mod: PBBFAC,,, | Performed by: FAMILY MEDICINE

## 2020-03-11 PROCEDURE — 99214 PR OFFICE/OUTPT VISIT, EST, LEVL IV, 30-39 MIN: ICD-10-PCS | Mod: S$GLB,,, | Performed by: FAMILY MEDICINE

## 2020-03-11 PROCEDURE — 3074F SYST BP LT 130 MM HG: CPT | Mod: CPTII,S$GLB,, | Performed by: FAMILY MEDICINE

## 2020-03-11 PROCEDURE — 3008F PR BODY MASS INDEX (BMI) DOCUMENTED: ICD-10-PCS | Mod: CPTII,S$GLB,, | Performed by: FAMILY MEDICINE

## 2020-03-11 PROCEDURE — 3079F DIAST BP 80-89 MM HG: CPT | Mod: CPTII,S$GLB,, | Performed by: FAMILY MEDICINE

## 2020-03-11 PROCEDURE — 99214 OFFICE O/P EST MOD 30 MIN: CPT | Mod: S$GLB,,, | Performed by: FAMILY MEDICINE

## 2020-03-11 RX ORDER — METOPROLOL SUCCINATE 25 MG/1
TABLET, EXTENDED RELEASE ORAL
Qty: 13 TABLET | Refills: 0 | Status: SHIPPED | OUTPATIENT
Start: 2020-03-11 | End: 2020-04-13

## 2020-03-11 RX ORDER — CHLORTHALIDONE 25 MG/1
25 TABLET ORAL DAILY
Qty: 30 TABLET | Refills: 1 | Status: SHIPPED | OUTPATIENT
Start: 2020-03-11 | End: 2020-06-09 | Stop reason: SDUPTHER

## 2020-03-11 NOTE — PATIENT INSTRUCTIONS
Learn about a heart-healthy lifestyle at the website of the American Heart Association, www.heart.org.      Nature Mark One ([employer]) is a brand of supplements that I have found trustworthy and reasonably priced, and they are available at most drugstores and online retailers. For you I recommend Nature Made potassium and fish oil.

## 2020-03-11 NOTE — ASSESSMENT & PLAN NOTE
BP Readings from Last 6 Encounters:   03/11/20 128/88   02/04/20 (!) 130/100   11/12/14 124/82     Well controlled, improved. Hypertension Digital Medicine program data reviewed.

## 2020-03-11 NOTE — ASSESSMENT & PLAN NOTE
Lab Results   Component Value Date    CHOL 187 02/05/2020    CHOL 169 10/14/2011    TRIG 228 (H) 02/05/2020    TRIG 103 10/14/2011    HDL 30 (L) 02/05/2020    HDL 32 (L) 10/14/2011    LDLCALC 111.4 02/05/2020    LDLCALC 116.4 10/14/2011    NONHDLCHOL 157 02/05/2020    AST 22 02/05/2020    ALT 36 02/05/2020     The 10-year ASCVD risk score (Naseem LADD Jr., et al., 2013) is: 2.7%    Values used to calculate the score:      Age: 39 years      Sex: Male      Is Non- : No      Diabetic: No      Tobacco smoker: No      Systolic Blood Pressure: 128 mmHg      Is BP treated: Yes      HDL Cholesterol: 30 mg/dL      Total Cholesterol: 187 mg/dL  Therapeutic lifestyle changes encouraged.

## 2020-03-12 ENCOUNTER — OFFICE VISIT (OUTPATIENT)
Dept: SLEEP MEDICINE | Facility: CLINIC | Age: 40
End: 2020-03-12
Payer: COMMERCIAL

## 2020-03-12 VITALS
HEART RATE: 85 BPM | OXYGEN SATURATION: 96 % | BODY MASS INDEX: 34.47 KG/M2 | RESPIRATION RATE: 16 BRPM | HEIGHT: 70 IN | WEIGHT: 240.75 LBS | SYSTOLIC BLOOD PRESSURE: 120 MMHG | DIASTOLIC BLOOD PRESSURE: 74 MMHG

## 2020-03-12 DIAGNOSIS — E66.09 CLASS 1 OBESITY DUE TO EXCESS CALORIES WITH SERIOUS COMORBIDITY AND BODY MASS INDEX (BMI) OF 34.0 TO 34.9 IN ADULT: Chronic | ICD-10-CM

## 2020-03-12 DIAGNOSIS — I10 BENIGN ESSENTIAL HYPERTENSION: Chronic | ICD-10-CM

## 2020-03-12 DIAGNOSIS — E78.2 MODERATE MIXED HYPERLIPIDEMIA NOT REQUIRING STATIN THERAPY: Chronic | ICD-10-CM

## 2020-03-12 DIAGNOSIS — R53.83 OTHER FATIGUE: ICD-10-CM

## 2020-03-12 DIAGNOSIS — G47.10 HYPERSOMNIA: Primary | Chronic | ICD-10-CM

## 2020-03-12 PROCEDURE — 3074F PR MOST RECENT SYSTOLIC BLOOD PRESSURE < 130 MM HG: ICD-10-PCS | Mod: CPTII,S$GLB,, | Performed by: INTERNAL MEDICINE

## 2020-03-12 PROCEDURE — 3078F DIAST BP <80 MM HG: CPT | Mod: CPTII,S$GLB,, | Performed by: INTERNAL MEDICINE

## 2020-03-12 PROCEDURE — 3008F BODY MASS INDEX DOCD: CPT | Mod: CPTII,S$GLB,, | Performed by: INTERNAL MEDICINE

## 2020-03-12 PROCEDURE — 3078F PR MOST RECENT DIASTOLIC BLOOD PRESSURE < 80 MM HG: ICD-10-PCS | Mod: CPTII,S$GLB,, | Performed by: INTERNAL MEDICINE

## 2020-03-12 PROCEDURE — 3008F PR BODY MASS INDEX (BMI) DOCUMENTED: ICD-10-PCS | Mod: CPTII,S$GLB,, | Performed by: INTERNAL MEDICINE

## 2020-03-12 PROCEDURE — 99205 PR OFFICE/OUTPT VISIT, NEW, LEVL V, 60-74 MIN: ICD-10-PCS | Mod: S$GLB,,, | Performed by: INTERNAL MEDICINE

## 2020-03-12 PROCEDURE — 99999 PR PBB SHADOW E&M-EST. PATIENT-LVL III: CPT | Mod: PBBFAC,,, | Performed by: INTERNAL MEDICINE

## 2020-03-12 PROCEDURE — 99999 PR PBB SHADOW E&M-EST. PATIENT-LVL III: ICD-10-PCS | Mod: PBBFAC,,, | Performed by: INTERNAL MEDICINE

## 2020-03-12 PROCEDURE — 99205 OFFICE O/P NEW HI 60 MIN: CPT | Mod: S$GLB,,, | Performed by: INTERNAL MEDICINE

## 2020-03-12 PROCEDURE — 3074F SYST BP LT 130 MM HG: CPT | Mod: CPTII,S$GLB,, | Performed by: INTERNAL MEDICINE

## 2020-03-12 NOTE — LETTER
March 12, 2020      CYN Rodriguez MD  [address]  [place] [address]           [facility]  [address] [facility] [place] [address]  Phone: [phone]  Fax: [phone]          Patient: Kenny Stern   MR Number: [id number]   YOB: 1980   Date of Visit: 3/12/2020       Dear Dr. CYN Rodriguez:    Thank you for referring Kenny Stern to me for evaluation. Attached you will find relevant portions of my assessment and plan of care.    If you have questions, please do not hesitate to call me. I look forward to following Kenny Stern along with you.    Sincerely,    Avel Betancourt MD    Enclosure  CC:  No Recipients    If you would like to receive this communication electronically, please contact [email or web address] or [phone] to request more information on [employer] access.    For providers and/or their staff who would like to refer a patient to [facility], please contact us through our one-stop-shop provider referral line, [facility], at [phone].    If you feel you have received this communication in error or would no longer like to receive these types of communications, please e-mail [email or web address]

## 2020-03-12 NOTE — PATIENT INSTRUCTIONS
Normal Breathing During Sleep  When you breathe, air travels through passages in your nose and throat. When these air passages are wide enough to let air flow freely, you breathe normally.       Front view of nose and mouth.   Side view of nose and mouth. During normal sleep, air flows freely past the structures in the throat.   Nasal structures  The septum is the wall that divides the left half of the nose from the right half. Turbinates are ridges in the nasal passage.  Throat structures  Air flows past soft, flexible structures where the mouth meets the throat: the soft palate, uvula, tonsils, and back of the tongue. Throat muscles hold these structures in place. While you sleep, the throat muscles relax a bit. But they normally stay tight enough to keep the airway open.  Date Last Reviewed: 7/18/2015 © 2000-2017 [employer]. [address] [place] [address]. All rights reserved. This information is not intended as a substitute for professional medical care. Always follow your healthcare professional's instructions.        Obstructive Sleep Apnea  Obstructive sleep apnea is a condition that causes your air passages to become narrowed or blocked during sleep. As a result, breathing stops for short periods. Your body wakes up enough for breathing to begin again, though you don't remember it. The cycle of stopped breathing and brief awakenings can repeat dozens of times a night. This prevents the body from getting to the deeper stages of sleep that are needed for good rest and may cause your body's oxygen level to fall.  Signs of sleep apnea include loud snoring, noisy breathing, and gasping sounds during sleep. Daytime symptoms include waking up tired after a full night's sleep, waking up with headaches, feeling very sleepy or falling asleep during the day, and having problems with memory or concentration.  Risk factors for sleep apnea include:  · Being overweight  · Being a man, or a  woman in menopause  · Smoking  · Using alcohol or sedating medicines  · Having enlarged structures in the nose or throat  Home care  Lifestyle changes that can help treat snoring and sleep apnea include the following:  · If you are overweight, lose weight. Talk to your healthcare provider about a weight-loss plan for you.  · Avoid alcohol for 3 to 4 hours before bedtime. Avoid sedating medications. Ask your healthcare provider about the medicines you take.  · If you smoke, talk to your healthcare provider about ways to quit.  · Sleep on your side. This can help prevent gravity from pulling relaxed throat tissues into your breathing passages.  · If you have allergies or sinus problems that block your nose, ask your healthcare provider for help.  Follow-up care  Follow up with your healthcare provider, or as advised. A diagnosis of sleep apnea is made with a sleep study. Your healthcare provider can tell you more about this test.  When to seek medical advice  Sleep apnea can make you more likely to have certain health problems. These include high blood pressure, heart attack, stroke, and sexual dysfunction. If you have sleep apnea, talk to your healthcare provider about the best treatments for you.  Date Last Reviewed: 4/1/2017  © 0526-0100 [employer]. [address] [place] [address]. All rights reserved. This information is not intended as a substitute for professional medical care. Always follow your healthcare professional's instructions.        Visiting a Sleep Clinic    Are you worried about having a sleep study? Talk to your healthcare provider if you have any concerns. Learn what to expect at the sleep clinic and try to relax before you come.  Before your study  Your healthcare provider will tell you how to prepare. Ask if you should take your usual medicines. Also:  · Avoid napping.  · Avoid caffeine and alcohol.  · Take a shower and wash your hair (dont use hair conditioner, hair  spray, and skin lotions).  · Eat dinner before you come to the sleep clinic. Pack a snack if you need one before bedtime.  · Bring what will make you comfortable, such as your pajamas, robe, slippers, hygiene items, and even your own pillow.  What you can expect  When you arrive at the sleep clinic, you will usually be checked in by a . A specialized sleep technologist will meet you in your room. Then you may change into your nightclothes. Small sensors are placed on your head and body with tape and gel. The sensors are then plugged into a machine that will monitor your sleep. If you need to use a restroom, the sensors can be unplugged. A camera in your room will record your body movements. The technologist will stay in a nearby room. If you need to talk to him or her, use the intercom.  What a sleep study does  A sleep study monitors all the stages of your sleep. To do this, the following are recorded:  · Eye movements  · Heart rate, brain waves, and muscle activity  · Level of oxygen in your blood  · Breathing and snoring  · Sudden leg or body movements  If you have breathing problems, Continuous Positive Airway Pressure (CPAP) may be used. CPAP is a device that can help you breathe by adding mild air pressure to your airway passages and improve your sleep. It may be used during the second half of your study or on another night.  Getting your results  The technologist can answer some of your questions about the sleep study. But only your healthcare provider can explain the results. He or she will have the report of your sleep study within 1 to 2 weeks. Then your treatment options can be discussed with your healthcare provider, or you may be referred to a sleep disorders specialist.  Date Last Reviewed: 8/9/2015  © 9949-5397 [employer]. [address] [place] [address], [place] [address]. All rights reserved. This information is not intended as a substitute for professional medical care. Always  follow your healthcare professional's instructions.        What is a Sleep Study?    Do you often have problems sleeping? Do you feel tired most days of the week? Talk to your healthcare provider or a sleep specialist. He or she may suggest that you have a sleep study. It can help diagnose a sleep disorder such as sleep apnea or narcolepsy. During the study, a special machine is used to monitor your sleep.  Who needs a sleep study?  If you have sleep problems that last longer than a few weeks, you may need a sleep study. Talk to your healthcare provider. Be prepared to answer questions about your health history. Try to keep a daily sleep diary for a week or 2. Write down the time you go to bed, the time you wake up, and anything that seems to affect your sleep. Then your healthcare provider can refer you to a sleep specialist and recommend a sleep study.  Monitoring your sleep  Your sleep can be monitored at a sleep clinic or at your home. In either case, your healthcare provider will discuss the results with you at a future visit:  · At a sleep clinic. Most sleep studies are done at a sleep clinic or a sleep lab. In many cases, you will need to stay overnight. You will sleep in a private room, much like a hotel or hospital room. A family member or a friend can come along, but cannot stay overnight. Most people dont have trouble sleeping during the study. In the morning you can go home. Sometimes you may be asked to remain at the lab the next day for a daytime nap study.  · At home. At times, a sleep study can be done at home. A home sleep study provides most of the same information as a study done at a clinic. A special computer is loaned to you by a sleep clinic or a medical supplier. You will be given instructions on how to use it. Or, someone may come to your home to help. Before bedtime, the computer is turned on to monitor your sleep all night. In the morning, you return the computer.  Date Last Reviewed:  8/9/2015  © 3300-1569 [employer]. [address] [place] [address], [place] [address]. All rights reserved. This information is not intended as a substitute for professional medical care. Always follow your healthcare professional's instructions.

## 2020-03-12 NOTE — TELEPHONE ENCOUNTER
Future Appointments   Date Time Provider Department Center   3/12/2020  4:00 PM Avel Betancourt MD [facility]   4/14/2020 10:10 AM LABORATORY, [facility] LAB [facility]   4/14/2020 10:20 AM CYN Rodriguez MD [facility]

## 2020-03-12 NOTE — PROGRESS NOTES
Subjective:       Patient ID: Kenny Stern is a 39 y.o. male.  Patient Active Problem List   Diagnosis    Benign essential hypertension    Class 1 obesity due to excess calories with serious comorbidity and body mass index (BMI) of 34.0 to 34.9 in adult    Hypersomnia    Fatigue    Moderate mixed hyperlipidemia not requiring statin therapy    Episodic tension-type headache, not intractable      There is no immunization history on file for this patient.     Social History     Tobacco Use   Smoking Status Never Smoker   Smokeless Tobacco Never Used     Chief Complaint:  Kenny Stern is 39 y.o.   Referred by HENRRY Rodriguez MD   Patient had home sleep study:  Evaluation for obstructive sleep apnea.  Study was nondiagnostic.  Sleep questionnaire reviewed.  Patient complains of snoring, non restorative sleep, hypersomnolence.  Patient persists he has adequate sleep at least 8 hr between 10:00 p.m. and 5:30 a.m..  His problems have been going on for the last year but was 8 months ago.  Sleeps for more than 10 hr and still non refreshed.  Legs feel restless at night but do not do sleep delay sleep.  He denies any sleep attacks  Sometimes when is excited he may experience narcolepsy like symptoms with weakness and dropped things.  He denied vivid dreams or sleep paralysis.  He denied moving violently in sleep.  He does grind his teeth at night  No alcohol no sleeping pills  He takes naps daily for 30 min  Overall he endorses having snoring, nocturnal diaphoresis, gasping for air at night, wake up with headache, wake up frequently at night, sleeping to lightly and too little, get tired when wake-up, easily irritated worry about getting enough sleep  , sleep poorly overall      The following portions of the patient's history were reviewed and updated as appropriate:   He  has a past medical history of Benign essential hypertension (2/4/2020), Moderate mixed hyperlipidemia not requiring statin therapy  "(3/11/2020), Primary snoring (2/4/2020), and Testicular cancer.  He does not have any pertinent problems on file.  He  has a past surgical history that includes Hernia repair; testicle removal  (Right); Adenoidectomy; Tonsillectomy; and Vasectomy.  His family history includes Diabetes in his father; Heart disease in his paternal grandfather.  He  reports that he has never smoked. He has never used smokeless tobacco. He reports that he drinks alcohol. He reports that he does not use drugs.  He has a current medication list which includes the following prescription(s): chlorthalidone and metoprolol succinate.  Current Outpatient Medications on File Prior to Visit   Medication Sig Dispense Refill    chlorthalidone (HYGROTEN) 25 MG Tab Take 1 tablet (25 mg total) by mouth once daily. 30 tablet 1    metoprolol succinate (TOPROL-XL) 25 MG 24 hr tablet Take 1 tablet (25 mg total) by mouth every evening for 7 days, THEN 0.5 tablets (12.5 mg total) every evening for 7 days, THEN 0.5 tablets (12.5 mg total) every other day for 8 days. THEN STOP. 13 tablet 0     No current facility-administered medications on file prior to visit.      He is allergic to pcn [penicillins]..     Review of Systems   Constitution: Positive for malaise/fatigue.   Respiratory: Positive for sleep disturbances due to breathing and snoring.    All other systems reviewed and are negative.     Objective:      Vitals:    03/12/20 1611   BP: 120/74   Pulse: 85   Resp: 16   SpO2: 96%   Weight: 109.2 kg (240 lb 11.9 oz)   Height: 5' 10" (1.778 m)        Physical Exam   Constitutional: He is oriented to person, place, and time. He appears well-developed and well-nourished.   HENT:   Head: Normocephalic and atraumatic.   Nose: Nose normal.   Mouth/Throat: Oropharynx is clear and moist.   Malampat score 2   Eyes: Pupils are equal, round, and reactive to light. EOM are normal.   Neck: Normal range of motion. Neck supple.   Neck 18"   Cardiovascular: Normal " rate, regular rhythm and normal heart sounds.   No murmur heard.  Pulmonary/Chest: Effort normal and breath sounds normal.   Abdominal: Soft.   Musculoskeletal: Normal range of motion. He exhibits no edema.   Neurological: He is alert and oriented to person, place, and time. No cranial nerve deficit.   Skin: Skin is warm and dry. Capillary refill takes 2 to 3 seconds.   Psychiatric: He has a normal mood and affect.   Nursing note and vitals reviewed.       Data Review:   CBC:   Lab Results   Component Value Date    WBC 8.46 02/05/2020    RBC 5.94 02/05/2020    HGB 17.8 02/05/2020    HCT 56.3 (H) 02/05/2020     02/05/2020     BMP:   Lab Results   Component Value Date     02/05/2020     02/05/2020    K 4.0 02/05/2020     02/05/2020    CO2 27 02/05/2020    BUN 13 02/05/2020    CREATININE 1.2 02/05/2020    CALCIUM 9.5 02/05/2020     HSAT  PHYSICIAN INTERPRETATION AND COMMENTS: Findings are consistent with PRIMARY SNORING: AHI 4.0  EVENTS PER HOUR. SATURATION MIKE IN 89.8%. LOUD SNORING. PLEASE REFER TO SLEEP DISORDER  CENTER FOR FURTHER EVALUATION: Ref 301 ORDER IN EPIC.  CLINICAL HISTORY: 39 year old male presented with: STOP BANG SCORE 6. CHOKING, WITNESSED APNEA, NON  REFRESHING SLEEP, MORNING HEADACHES. 18.5 inch neck, BMI of 34, an Marion sleepiness score of 6, history of  hypertension and symptoms of nocturnal snoring, waking up choking and witnessed apneas. Based on the clinical history, the patient  has a high pre-test probability of having severe NEWTON.  SLEEP STUDY FINDINGS: Patient underwent a one night Home Sleep Test and by behavioral criteria, slept for approximately 6  hours, with a sleep latency of 29 minutes and a sleep efficiency of 89.8%. The patient slept supine 33.3% of the night based on valid  recording time of 6.02 hours. When considering more subtle measures of sleep disordered breathing, the overall respiratory  disturbance index is mild (RDI=15) based on a 1% hypopnea  desaturation criteria with confirmation by surrogate arousal  indicators. The apneas/hypopneas are accompanied by minimal oxygen desaturation (percent time below 90% SpO2: 0.0%, Min  SpO2: 89.8%). The average desaturation across all sleep disordered breathing events is 2.2%. Snoring occurs for 35.3% (30 dB)  of the study, 15.9% is extremely loud. The mean pulse rate is 56 BPM, with very frequent pulse rate variability (76 events with >= 6  BPM increase/decrease per hour).  TREATMENT CONSIDERATIONS: For a patient with mild asymptomatic NEWTON, nasal continuous positive airway pressure  (CPAP/AutoPAP) therapy or alternative therapies for treatment should be considered, i.e., Mandibular advancement splint (MAS),  referral to an ENT for surgical modifications to the airway, and/or weight loss or behavioral therapy to reduce the potential risk of  daytime somnolence, hypertension, cardiovascular disease, stroke and diabetes.  DISEASE MANAGEMENT CONSIDERATIONS: Morning headaches are symptoms that can be associated with  untreated NEWTON.  Diagnostics:  Chest x-ray 02/07/2020 reviewed  EXAMINATION:  XR CHEST PA AND LATERAL    CLINICAL HISTORY:  Essential (primary) hypertension    TECHNIQUE:  PA and lateral views of the chest were performed.    COMPARISON:  None    FINDINGS:  The lungs are clear and free of infiltrate.  No pleural effusion or pneumothorax. The heart is not enlarged.      Impression       1.  No acute cardiopulmonary process.        Assessment:      Problem List Items Addressed This Visit     Class 1 obesity due to excess calories with serious comorbidity and body mass index (BMI) of 34.0 to 34.9 in adult (Chronic)    Relevant Orders    Polysomnography with MSLT    Fatigue    Relevant Orders    Polysomnography with MSLT    Benign essential hypertension (Chronic)    Relevant Orders    Polysomnography with MSLT    Hypersomnia - Primary (Chronic)    Relevant Orders    Polysomnography with MSLT    Moderate mixed  hyperlipidemia not requiring statin therapy (Chronic)    Relevant Orders    Polysomnography with MSLT         Plan:        Follow up after PSG MSLT.    This note was prepared using voice recognition system and is likely to have sound alike errors that may have been overlooked even after proof reading.  Please call me with any questions    Discussed diagnosis, its evaluation, treatment and usual course. All questions answered.    Thank you for the courtesy of participating in the care of this patient    Avel Betancourt MD

## 2020-03-25 ENCOUNTER — PATIENT OUTREACH (OUTPATIENT)
Dept: OTHER | Facility: OTHER | Age: 40
End: 2020-03-25

## 2020-03-25 NOTE — PROGRESS NOTES
3/25 - I spoke with the patient very briefly today.  I did advise that he trying take a blood pressure reading every other day

## 2020-03-25 NOTE — PROGRESS NOTES
Digital Medicine: Health  Follow-Up    The history is provided by the patient. No  was used.     Follow Up  Follow-up reason(s): reading review      Readings are trending down due to medication adherence and lifestyle maintenance .      INTERVENTION(S)  encouragement/support    PLAN  patient verbalizes understanding    There are no preventive care reminders to display for this patient.    Last 5 Patient Entered Readings                                      Current 30 Day Average: 126/86     Recent Readings 3/21/2020 3/15/2020 3/11/2020 3/10/2020 2/18/2020    SBP (mmHg) 143 128 110 122 138    DBP (mmHg) 94 94 72 84 94    Pulse 54 65 59 76 80            Diet Screening   No change to diet.      Physical Activity Screening   No change to exercise routine.        Medication Adherence Screening   He did not miss a dose this month.    Mr. Stern is following his current medication regimen prescribed by his doctor. No questions or concerns at this time.

## 2020-04-13 NOTE — PROGRESS NOTES
Digital Medicine: Clinician Follow-Up    I spoke with the patient today to f/u on his elevated BP readings. He says that he just weened off of the metoprolol 2 days ago and started the first dose of his Chlorthalidone yesterday (as advised by his PCP). His SBP from yesterday was at goal, but his DBP is consistently >80.    The history is provided by the patient. No  was used.     Follow Up  Follow-up reason(s): reading review      Readings are trending up       INTERVENTION(S)  reviewed appropriate dose schedule, recommended diet modifications and recommended physical activity    PLAN  patient verbalizes understanding, patient amenable to changes and additional monitoring needed    Avr /94, above goal    He started chlorthalidone 25 yesterday. F/u in 2 weeks.        There are no preventive care reminders to display for this patient.    Last 5 Patient Entered Readings                                      Current 30 Day Average: 135/94     Recent Readings 4/12/2020 4/4/2020 3/28/2020 3/21/2020 3/15/2020    SBP (mmHg) 127 143 132 143 128    DBP (mmHg) 97 93 91 94 94    Pulse 77 65 79 54 65             Hypertension Medications             chlorthalidone (HYGROTEN) 25 MG Tab Take 1 tablet (25 mg total) by mouth once daily.    metoprolol succinate (TOPROL-XL) 25 MG 24 hr tablet Take 1 tablet (25 mg total) by mouth every evening for 7 days, THEN 0.5 tablets (12.5 mg total) every evening for 7 days, THEN 0.5 tablets (12.5 mg total) every other day for 8 days. THEN STOP.                 Screenings

## 2020-04-22 ENCOUNTER — PATIENT OUTREACH (OUTPATIENT)
Dept: OTHER | Facility: OTHER | Age: 40
End: 2020-04-22

## 2020-04-29 ENCOUNTER — PATIENT OUTREACH (OUTPATIENT)
Dept: OTHER | Facility: OTHER | Age: 40
End: 2020-04-29

## 2020-04-29 DIAGNOSIS — I10 BENIGN ESSENTIAL HYPERTENSION: Primary | Chronic | ICD-10-CM

## 2020-04-29 NOTE — PROGRESS NOTES
Digital Medicine: Clinician Follow-Up    I spoke with the patient today to follow-up on his blood pressure readings.  At my last out reach he had just started the chlorthalidone.  Today he says he is tolerating it well.  He has lab work scheduled in 2 weeks to check his potassium level.    The history is provided by the patient. No  was used.     Follow Up  Follow-up reason(s): reading review      Patient started new medication.    Date:  4/12/2020  Is patient tolerating med change?:  Yes      INTERVENTION(S)  reviewed appropriate dose schedule    PLAN  patient amenable to changes    Avr Bp 134/92    Continue chlorthalidone. Bmp in 2 weeks. F/u in 4 weeks      There are no preventive care reminders to display for this patient.    Last 5 Patient Entered Readings                                      Current 30 Day Average: 134/92     Recent Readings 4/26/2020 4/25/2020 4/12/2020 4/4/2020 3/28/2020    SBP (mmHg) 136 130 127 143 132    DBP (mmHg) 90 89 97 93 91    Pulse 78 79 77 65 79             Hypertension Medications             chlorthalidone (HYGROTEN) 25 MG Tab Take 1 tablet (25 mg total) by mouth once daily.                 Screenings

## 2020-05-26 ENCOUNTER — TELEPHONE (OUTPATIENT)
Dept: PULMONOLOGY | Facility: HOSPITAL | Age: 40
End: 2020-05-26

## 2020-05-26 NOTE — TELEPHONE ENCOUNTER
----- Message from Oliverio Sue sent at 5/22/2020  9:38 AM CDT -----  Hello, Patient decided to cancel testing. He stated things are improving. PSG/MSLT was ordered.    Thanks

## 2020-06-02 NOTE — PROGRESS NOTES
Digital Medicine: Health  Follow-Up    The history is provided by the patient. No  was used.     Follow Up  Follow-up reason(s): reading review      Readings are missing.   patient reminder needed.  Mr. Stern has not been taking readings due to having a busy schedule. Noticed readings in the past were trending up. He states that he has been under some stress due to his mother being diagnosed with cancer. He knows this can affect his BP.     Mr. Stern states that he needs a refill on his medications. Will route to Clinician and place a task.     INTERVENTION(S)  encouragement/support    PLAN  Clinician follow-up and continue monitoring    There are no preventive care reminders to display for this patient.    Last 5 Patient Entered Readings                                      Current 30 Day Average: 148/93     Recent Readings 5/18/2020 5/16/2020 5/15/2020 5/14/2020 5/4/2020    SBP (mmHg) 145 155 143 147 149    DBP (mmHg) 90 88 94 99 95    Pulse 65 88 71 78 59            Medication Adherence Screening       Mr. Stern feels that he made benefit from a increase in medication dosage. He has previously spoken to his doctor about this, stating that his doctor put him on a low dose and they discussed increasing if it did not help. Will route to Clinician for follow up.

## 2020-06-09 ENCOUNTER — PATIENT OUTREACH (OUTPATIENT)
Dept: OTHER | Facility: OTHER | Age: 40
End: 2020-06-09

## 2020-06-09 DIAGNOSIS — I10 BENIGN ESSENTIAL HYPERTENSION: Chronic | ICD-10-CM

## 2020-06-09 RX ORDER — CHLORTHALIDONE 25 MG/1
25 TABLET ORAL DAILY
Qty: 30 TABLET | Refills: 1 | Status: SHIPPED | OUTPATIENT
Start: 2020-06-09 | End: 2021-06-09

## 2020-06-09 NOTE — PROGRESS NOTES
Digital Medicine: Clinician Follow-Up    I spoke with the patient today to follow-up on his blood pressure readings.  He has been out of his chlorthalidone pills for a couple of days.  He has not gotten lab work to check his electrolyte levels since starting the chlorthalidone, but labs are scheduled for next week.  His most recent blood pressure had systolic BP in the 140s and diastolic in the high 80s.  He does admit that he could probably limit his sodium intake further.    The history is provided by the patient. No  was used.   Follow Up  Follow-up reason(s): reading review          INTERVENTION(S)  reviewed appropriate dose schedule, recommended diet modifications and encouragement/support    PLAN  patient verbalizes understanding and additional monitoring needed    Average blood pressure 147/92    No med changes until his upcoming BMP.  He also has a PCP appointment at the end of this month, so will also just hold off on any medication changes until that in office blood pressure reading.  Will refill meds and follow-up in 4 weeks      There are no preventive care reminders to display for this patient.    Last 5 Patient Entered Readings                                      Current 30 Day Average: 147/92     Recent Readings 6/2/2020 5/18/2020 5/16/2020 5/15/2020 5/14/2020    SBP (mmHg) 144 145 155 143 147    DBP (mmHg) 88 90 88 94 99    Pulse 66 65 88 71 78             Hypertension Medications             chlorthalidone (HYGROTEN) 25 MG Tab Take 1 tablet (25 mg total) by mouth once daily.                 Screenings

## 2020-06-15 ENCOUNTER — LAB VISIT (OUTPATIENT)
Dept: LAB | Facility: HOSPITAL | Age: 40
End: 2020-06-15
Attending: FAMILY MEDICINE
Payer: COMMERCIAL

## 2020-06-15 DIAGNOSIS — I10 BENIGN ESSENTIAL HYPERTENSION: Chronic | ICD-10-CM

## 2020-06-15 LAB
ANION GAP SERPL CALC-SCNC: 9 MMOL/L (ref 8–16)
BUN SERPL-MCNC: 12 MG/DL (ref 6–20)
CALCIUM SERPL-MCNC: 9.7 MG/DL (ref 8.7–10.5)
CHLORIDE SERPL-SCNC: 104 MMOL/L (ref 95–110)
CO2 SERPL-SCNC: 27 MMOL/L (ref 23–29)
CREAT SERPL-MCNC: 1.1 MG/DL (ref 0.5–1.4)
EST. GFR  (AFRICAN AMERICAN): >60 ML/MIN/1.73 M^2
EST. GFR  (NON AFRICAN AMERICAN): >60 ML/MIN/1.73 M^2
GLUCOSE SERPL-MCNC: 85 MG/DL (ref 70–110)
POTASSIUM SERPL-SCNC: 3.9 MMOL/L (ref 3.5–5.1)
SODIUM SERPL-SCNC: 140 MMOL/L (ref 136–145)

## 2020-06-15 PROCEDURE — 36415 COLL VENOUS BLD VENIPUNCTURE: CPT

## 2020-06-15 PROCEDURE — 80048 BASIC METABOLIC PNL TOTAL CA: CPT

## 2020-06-27 NOTE — PROGRESS NOTES
"Hi, Kenny.    I'm happy to report that these test results are fine and do not require a change in treatment.    Thanks for letting me care for you, thanks for trusting us with your healthcare needs, and thanks for using [facility].    Sincerely,    CYN Rodriguez MD  --------------------------------------------------------------------------------   Want to learn more about your test results and what they mean? (1) Log in to your [facility] account at [email or web address]. (2) From the "View test results" tab, click on the test you want to know more about. (3) Click on the "About This Test" link."

## 2020-07-07 ENCOUNTER — PATIENT OUTREACH (OUTPATIENT)
Dept: OTHER | Facility: OTHER | Age: 40
End: 2020-07-07

## 2020-07-28 NOTE — PROGRESS NOTES
Digital Medicine: Health  Follow-Up    The history is provided by the patient.             Reason for review: Blood pressure not at goal      Patient needs assistance troubleshooting: patient discontinuing program .    Additional Follow-up details: Mr. Stern recently switched doctors. He is no longer seeing Dr. Rodriguez. He has been monitoring his health with his new doctor first hand, and this is why readings are missing from the program. At this time, Mr. Stern would like to discontinue the [facility] Program. Explained that the monitor is his to keep and encouraged Mr. Stern to reach out if he was ever interested in being apart of the program again. Mr. Stern appreciated this and appreciated our time.         Diet-Not assessed      Additional diet details:    Physical Activity-Not assessed    Medication Adherence-Medication Adherence not addressed.      Substance, Sleep, Stress-Not assessed    There are no preventive care reminders to display for this patient.    Last 5 Patient Entered Readings                                      Current 30 Day Average: 132/97     Recent Readings 6/30/2020 6/28/2020 6/28/2020 6/10/2020 6/2/2020    SBP (mmHg) 138 126 137 131 144    DBP (mmHg) 94 98 100 85 88    Pulse 59 100 115 79 66

## 2020-09-25 ENCOUNTER — PATIENT MESSAGE (OUTPATIENT)
Dept: OTHER | Facility: OTHER | Age: 40
End: 2020-09-25

## 2021-03-25 ENCOUNTER — PATIENT MESSAGE (OUTPATIENT)
Dept: ADMINISTRATIVE | Facility: HOSPITAL | Age: 41
End: 2021-03-25

## 2021-04-13 ENCOUNTER — PATIENT OUTREACH (OUTPATIENT)
Dept: ADMINISTRATIVE | Facility: HOSPITAL | Age: 41
End: 2021-04-13

## 2025-06-13 NOTE — PROGRESS NOTES
CHIEF COMPLAINT  Follow-up      DIAGNOSES, HISTORY, ASSESSMENT, AND PLAN     1. Benign essential hypertension    2. Moderate mixed hyperlipidemia not requiring statin therapy    3. Hypersomnia    4. Episodic tension-type headache, not intractable    5. Atypical chest pain        Problem List Items Addressed This Visit        Neuro    Episodic tension-type headache, not intractable (Chronic)    Current Assessment & Plan     OTC meds help. Averages taking 3 doses of OTC meds per week.            Cardiac/Vascular    Benign essential hypertension - Primary (Chronic)    Current Assessment & Plan     BP Readings from Last 6 Encounters:   03/11/20 128/88   02/04/20 (!) 130/100   11/12/14 124/82     Well controlled, improved. Hypertension Digital Medicine program data reviewed.           Relevant Medications    metoprolol succinate (TOPROL-XL) 25 MG 24 hr tablet    chlorthalidone (HYGROTEN) 25 MG Tab    Other Relevant Orders    Potassium       Other    Moderate mixed hyperlipidemia not requiring statin therapy (Chronic)    Current Assessment & Plan     Lab Results   Component Value Date    CHOL 187 02/05/2020    CHOL 169 10/14/2011    TRIG 228 (H) 02/05/2020    TRIG 103 10/14/2011    HDL 30 (L) 02/05/2020    HDL 32 (L) 10/14/2011    LDLCALC 111.4 02/05/2020    LDLCALC 116.4 10/14/2011    NONHDLCHOL 157 02/05/2020    AST 22 02/05/2020    ALT 36 02/05/2020     The 10-year ASCVD risk score (Boyers WILBERTO Jr., et al., 2013) is: 2.7%    Values used to calculate the score:      Age: 39 years      Sex: Male      Is Non- : No      Diabetic: No      Tobacco smoker: No      Systolic Blood Pressure: 128 mmHg      Is BP treated: Yes      HDL Cholesterol: 30 mg/dL      Total Cholesterol: 187 mg/dL  Therapeutic lifestyle changes encouraged.            Hypersomnia (Chronic)    Overview     Home Sleep Studies  Date/Time: 2/20/2020 8:00 AM  Performed by: Avel Betancourt MD  Authorized by: CYN Rodriguez  Mood at 3 with 10 being the worse and shared the group was most helpful today.    Symptoms include:    Trouble with concentration, memory, or making decisions  Easily distracted  Increased nervous feelings/anxiety  Racing thoughts    Denied SI.  Shared appropriate goals. Pt is discharging from IOP today and voiced looking forward to attending Continuing Care.     MD    PHYSICIAN INTERPRETATION AND COMMENTS: Findings are consistent with PRIMARY SNORING: AHI 4.0  EVENTS PER HOUR. SATURATION MIKE IN 89.8%. LOUD SNORING. PLEASE REFER TO SLEEP DISORDER  CENTER FOR FURTHER EVALUATION: Ref 301 ORDER IN EPIC.  CLINICAL HISTORY: 39 year old male presented with: STOP BANG SCORE 6. CHOKING, WITNESSED APNEA, NON  REFRESHING SLEEP, MORNING HEADACHES. 18.5 inch neck, BMI of 34, an [place] sleepiness score of 6, history of  hypertension and symptoms of nocturnal snoring, waking up choking and witnessed apneas. Based on the clinical history, the patient  has a high pre-test probability of having severe [place].  SLEEP STUDY FINDINGS: Patient underwent a one night Home Sleep Test and by behavioral criteria, slept for approximately 6  hours, with a sleep latency of 29 minutes and a sleep efficiency of 89.8%. The patient slept supine 33.3% of the night based on valid  recording time of 6.02 hours. When considering more subtle measures of sleep disordered breathing, the overall respiratory  disturbance index is mild (RDI=15) based on a 1% hypopnea desaturation criteria with confirmation by surrogate arousal  indicators. The apneas/hypopneas are accompanied by minimal oxygen desaturation (percent time below 90% SpO2: 0.0%, Min  SpO2: 89.8%). The average desaturation across all sleep disordered breathing events is 2.2%. Snoring occurs for 35.3% (30 dB)  of the study, 15.9% is extremely loud. The mean pulse rate is 56 BPM, with very frequent pulse rate variability (76 events with >= 6  BPM increase/decrease per hour).         Current Assessment & Plan     We discussed risks and benefits of treatment options. He wants consultation with sleep clinic.         Relevant Orders    Ambulatory referral/consult to Sleep Disorders    RESOLVED: Atypical chest pain    Overview     Exercise stress test showed no ischemic changes.         Current Assessment & Plan     This condition appears to be RESOLVED. He  reports no residual symptoms or impairment.                MEDICATION MANAGMENT    Outpatient Medications Prior to Visit   Medication Sig Dispense Refill    metoprolol succinate (TOPROL-XL) 50 MG 24 hr tablet Take 1 tablet (50 mg total) by mouth every evening. 30 tablet 1     No facility-administered medications prior to visit.         Medications Discontinued During This Encounter   Medication Reason    metoprolol succinate (TOPROL-XL) 50 MG 24 hr tablet Alternate therapy        Medications Ordered This Encounter   Medications    chlorthalidone (HYGROTEN) 25 MG Tab     Sig: Take 1 tablet (25 mg total) by mouth once daily.     Dispense:  30 tablet     Refill:  1     Dispense only 30-day QTY. Do not request 90-day QTY.    metoprolol succinate (TOPROL-XL) 25 MG 24 hr tablet     Sig: Take 1 tablet (25 mg total) by mouth every evening for 7 days, THEN 0.5 tablets (12.5 mg total) every evening for 7 days, THEN 0.5 tablets (12.5 mg total) every other day for 8 days. THEN STOP.     Dispense:  13 tablet     Refill:  0     Dispense only quantity prescribed. Do not request larger quantity.       FOLLOW-UP  Follow up in about 1 month (around 4/11/2020) for review test results, discuss treatment plan, re-evaluate problem(s) discussed today.     REVIEW OF SYSTEMS  Answers for HPI/ROS submitted by the patient on 3/11/2020   activity change: No  unexpected weight change: No  hearing loss: No  rhinorrhea: No  trouble swallowing: No  eye discharge: No  visual disturbance: No  chest tightness: No  wheezing: No  chest pain: No  palpitations: No  blood in stool: No  constipation: No  vomiting: No  diarrhea: Yes  polydipsia: No  polyuria: No  difficulty urinating: No  urgency: No  hematuria: No  joint swelling: No  arthralgias: No  headaches: Yes  weakness: No  confusion: No  dysphoric mood: No      PHYSICAL EXAM  Vitals:    03/11/20 1046   BP: 128/88   BP Location: Left arm   Patient Position: Sitting   BP Method: Large (Manual)  "  Pulse: 70   Temp: 98.1 °F (36.7 °C)   TempSrc: Oral   SpO2: 95%   Weight: 108 kg (238 lb 1.6 oz)   Height: 5' 10" (1.778 m)     CONSTITUTIONAL: Vital signs noted. No apparent distress. Does not appear acutely ill or septic. Appears adequately hydrated.  HEENT: External ENT grossly unremarkable. Hearing grossly intact. Oropharynx moist.  PULM: Lungs clear. Breathing unlabored.  HEART: Auscultation reveals regular rate and rhythm without murmur, gallop or rub.  ABDOMEN: Soft and nontender. Bowel sounds present.  DERM: Skin warm and moist with normal turgor.  NEURO: There are no gross focal motor deficits or gross deficits of cranial nerves III-XII.  PSYCHIATRIC: Alert and conversant. Mood grossly neutral. Judgment and insight grossly intact.     Documentation entered by me for this encounter may have been done in part using speech-recognition technology. Although I have made an effort to ensure accuracy, "sound like" errors may exist and should be interpreted in context. -CYN Rodriguez MD.   "